# Patient Record
Sex: FEMALE | Race: WHITE | Employment: FULL TIME | URBAN - METROPOLITAN AREA
[De-identification: names, ages, dates, MRNs, and addresses within clinical notes are randomized per-mention and may not be internally consistent; named-entity substitution may affect disease eponyms.]

---

## 2017-04-26 ENCOUNTER — HOSPITAL ENCOUNTER (OUTPATIENT)
Dept: CT IMAGING | Facility: HOSPITAL | Age: 37
Discharge: HOME | End: 2017-04-26
Attending: OBSTETRICS & GYNECOLOGY

## 2017-04-26 DIAGNOSIS — Z13.9 ENCOUNTER FOR SCREENING: ICD-10-CM

## 2017-07-13 ENCOUNTER — APPOINTMENT (OUTPATIENT)
Dept: ULTRASOUND IMAGING | Facility: HOSPITAL | Age: 37
DRG: 872 | End: 2017-07-13
Attending: HOSPITALIST
Payer: COMMERCIAL

## 2017-07-13 ENCOUNTER — HOSPITAL ENCOUNTER (INPATIENT)
Facility: HOSPITAL | Age: 37
LOS: 1 days | Discharge: HOME | DRG: 872 | End: 2017-07-15
Attending: EMERGENCY MEDICINE | Admitting: HOSPITALIST
Payer: COMMERCIAL

## 2017-07-13 DIAGNOSIS — A41.9 SEPSIS DUE TO URINARY TRACT INFECTION (HCC): ICD-10-CM

## 2017-07-13 DIAGNOSIS — N39.0 URINARY TRACT INFECTION, SITE UNSPECIFIED: Primary | ICD-10-CM

## 2017-07-13 DIAGNOSIS — N39.0 SEPSIS DUE TO URINARY TRACT INFECTION (HCC): ICD-10-CM

## 2017-07-13 LAB
ANION GAP SERPL CALC-SCNC: 8 MMOL/L (ref 0–18)
B-HCG UR QL: NEGATIVE
BASOPHILS # BLD: 0 K/UL (ref 0–0.2)
BASOPHILS NFR BLD: 0 %
BILIRUB UR QL: NEGATIVE
BUN SERPL-MCNC: 12 MG/DL (ref 8–20)
BUN/CREAT SERPL: 15.2 (ref 10–20)
CALCIUM SERPL-MCNC: 8.2 MG/DL (ref 8.5–10.5)
CHLORIDE SERPL-SCNC: 112 MMOL/L (ref 95–110)
CO2 SERPL-SCNC: 17 MMOL/L (ref 22–32)
COLOR UR: YELLOW
CREAT SERPL-MCNC: 0.79 MG/DL (ref 0.5–1.5)
EOSINOPHIL # BLD: 0 K/UL (ref 0–0.7)
EOSINOPHIL NFR BLD: 1 %
ERYTHROCYTE [DISTWIDTH] IN BLOOD BY AUTOMATED COUNT: 13 % (ref 11–15)
GLUCOSE SERPL-MCNC: 115 MG/DL (ref 70–99)
GLUCOSE UR-MCNC: NEGATIVE MG/DL
HCT VFR BLD AUTO: 35 % (ref 35–48)
HGB BLD-MCNC: 12.2 G/DL (ref 12–16)
LACTATE SERPL-SCNC: 1.1 MMOL/L (ref 0.5–2.2)
LYMPHOCYTES # BLD: 0.8 K/UL (ref 1–4)
LYMPHOCYTES NFR BLD: 14 %
MCH RBC QN AUTO: 31.7 PG (ref 27–32)
MCHC RBC AUTO-ENTMCNC: 34.9 G/DL (ref 32–37)
MCV RBC AUTO: 90.8 FL (ref 80–100)
MONOCYTES # BLD: 0.2 K/UL (ref 0–1)
MONOCYTES NFR BLD: 4 %
NEUTROPHILS # BLD AUTO: 4.5 K/UL (ref 1.8–7.7)
NEUTROPHILS NFR BLD: 82 %
NITRITE UR QL STRIP.AUTO: POSITIVE
OSMOLALITY UR CALC.SUM OF ELEC: 285 MOSM/KG (ref 275–295)
PH UR: 5 [PH] (ref 5–8)
PLATELET # BLD AUTO: 127 K/UL (ref 140–400)
PMV BLD AUTO: 8.7 FL (ref 7.4–10.3)
POTASSIUM SERPL-SCNC: 3.4 MMOL/L (ref 3.3–5.1)
PROT UR-MCNC: 100 MG/DL
RBC # BLD AUTO: 3.86 M/UL (ref 3.7–5.4)
RBC #/AREA URNS AUTO: 15 /HPF
SODIUM SERPL-SCNC: 137 MMOL/L (ref 136–144)
SP GR UR STRIP: 1.01 (ref 1–1.03)
UROBILINOGEN UR STRIP-ACNC: <2
VIT C UR-MCNC: NEGATIVE MG/DL
WBC # BLD AUTO: 5.5 K/UL (ref 4–11)
WBC #/AREA URNS AUTO: 73 /HPF

## 2017-07-13 PROCEDURE — 87086 URINE CULTURE/COLONY COUNT: CPT | Performed by: EMERGENCY MEDICINE

## 2017-07-13 PROCEDURE — 87040 BLOOD CULTURE FOR BACTERIA: CPT | Performed by: EMERGENCY MEDICINE

## 2017-07-13 PROCEDURE — 87186 SC STD MICRODIL/AGAR DIL: CPT | Performed by: EMERGENCY MEDICINE

## 2017-07-13 PROCEDURE — 87088 URINE BACTERIA CULTURE: CPT | Performed by: EMERGENCY MEDICINE

## 2017-07-13 PROCEDURE — 81025 URINE PREGNANCY TEST: CPT

## 2017-07-13 PROCEDURE — 83605 ASSAY OF LACTIC ACID: CPT | Performed by: EMERGENCY MEDICINE

## 2017-07-13 PROCEDURE — 76770 US EXAM ABDO BACK WALL COMP: CPT | Performed by: HOSPITALIST

## 2017-07-13 PROCEDURE — 81001 URINALYSIS AUTO W/SCOPE: CPT | Performed by: EMERGENCY MEDICINE

## 2017-07-13 PROCEDURE — 96361 HYDRATE IV INFUSION ADD-ON: CPT

## 2017-07-13 PROCEDURE — 87077 CULTURE AEROBIC IDENTIFY: CPT | Performed by: EMERGENCY MEDICINE

## 2017-07-13 PROCEDURE — 36415 COLL VENOUS BLD VENIPUNCTURE: CPT

## 2017-07-13 PROCEDURE — 85025 COMPLETE CBC W/AUTO DIFF WBC: CPT | Performed by: EMERGENCY MEDICINE

## 2017-07-13 PROCEDURE — 96365 THER/PROPH/DIAG IV INF INIT: CPT

## 2017-07-13 PROCEDURE — 99285 EMERGENCY DEPT VISIT HI MDM: CPT

## 2017-07-13 PROCEDURE — 80048 BASIC METABOLIC PNL TOTAL CA: CPT | Performed by: EMERGENCY MEDICINE

## 2017-07-13 RX ORDER — SODIUM CHLORIDE 0.9 % (FLUSH) 0.9 %
3 SYRINGE (ML) INJECTION AS NEEDED
Status: DISCONTINUED | OUTPATIENT
Start: 2017-07-13 | End: 2017-07-15

## 2017-07-13 RX ORDER — IBUPROFEN 200 MG
CAPSULE ORAL DAILY
Status: DISCONTINUED | OUTPATIENT
Start: 2017-07-13 | End: 2017-07-15

## 2017-07-13 RX ORDER — ACETAMINOPHEN 500 MG
1000 TABLET ORAL ONCE
Status: COMPLETED | OUTPATIENT
Start: 2017-07-13 | End: 2017-07-13

## 2017-07-13 RX ORDER — ACETAMINOPHEN 325 MG/1
650 TABLET ORAL EVERY 6 HOURS PRN
Status: DISCONTINUED | OUTPATIENT
Start: 2017-07-13 | End: 2017-07-15

## 2017-07-13 RX ORDER — LIDOCAINE 50 MG/G
1 PATCH TOPICAL EVERY 24 HOURS
Status: COMPLETED | OUTPATIENT
Start: 2017-07-13 | End: 2017-07-14

## 2017-07-13 RX ORDER — ONDANSETRON 2 MG/ML
4 INJECTION INTRAMUSCULAR; INTRAVENOUS EVERY 6 HOURS PRN
Status: DISCONTINUED | OUTPATIENT
Start: 2017-07-13 | End: 2017-07-15

## 2017-07-13 RX ORDER — METOCLOPRAMIDE HYDROCHLORIDE 5 MG/ML
10 INJECTION INTRAMUSCULAR; INTRAVENOUS EVERY 8 HOURS PRN
Status: DISCONTINUED | OUTPATIENT
Start: 2017-07-13 | End: 2017-07-15

## 2017-07-13 RX ORDER — SODIUM CHLORIDE 9 MG/ML
INJECTION, SOLUTION INTRAVENOUS CONTINUOUS
Status: DISCONTINUED | OUTPATIENT
Start: 2017-07-13 | End: 2017-07-14

## 2017-07-13 RX ORDER — POTASSIUM CHLORIDE 20 MEQ/1
40 TABLET, EXTENDED RELEASE ORAL EVERY 4 HOURS
Status: COMPLETED | OUTPATIENT
Start: 2017-07-13 | End: 2017-07-14

## 2017-07-13 NOTE — H&P
[facility] Hospitalist H&P     CC: Patient presents with:  Nausea/Vomiting/Diarrhea (gastrointestinal)       PCP: Adrien Ibrahim Service is a 43year old female with PMH sig for infertility who presents with rigors working 7/13/17 [facility]). Soc Hx     Smoking status: Never Smoker    Smokeless tobacco: Not on file    Alcohol use Yes        Fam Hx  No family history on file.         Objective     Temp: 99.9 °F (37.7 °C)  Pulse: 105  Resp: 24  BP: 105/59

## 2017-07-13 NOTE — ED PROVIDER NOTES
Patient Seen in: [facility] AND [facility] Emergency Department    History   Patient presents with:  Nausea/Vomiting/Diarrhea (gastrointestinal)    Stated Complaint: N/V chills     HPI    Pt is 44 yo F who p/w sudden onset shaking chills and rigors while at work hot and clammy.  no rashes, Sutures on left heel clean/dry/intact        ED Course     Labs Reviewed   BASIC METABOLIC PANEL (8) - Abnormal; Notable for the following:        Result Value    Glucose 115 (*)     Chloride 112 (*)     CO2 17 (*)     Calcium, T collecting and interpreting tests and discussion with consultants but not including time spent performing procedures. [facility]      Sepsis Reassessment Note    /62   Pulse 109   Temp 100 °F (37.8 °C) (Oral)   Resp 18   Ht 162. 6

## 2017-07-14 LAB
ANION GAP SERPL CALC-SCNC: 6 MMOL/L (ref 0–18)
BASOPHILS # BLD: 0 K/UL (ref 0–0.2)
BASOPHILS NFR BLD: 0 %
BUN SERPL-MCNC: 7 MG/DL (ref 8–20)
BUN/CREAT SERPL: 10.3 (ref 10–20)
CALCIUM SERPL-MCNC: 8.5 MG/DL (ref 8.5–10.5)
CHLORIDE SERPL-SCNC: 112 MMOL/L (ref 95–110)
CO2 SERPL-SCNC: 20 MMOL/L (ref 22–32)
CREAT SERPL-MCNC: 0.68 MG/DL (ref 0.5–1.5)
EOSINOPHIL # BLD: 0.1 K/UL (ref 0–0.7)
EOSINOPHIL NFR BLD: 1 %
ERYTHROCYTE [DISTWIDTH] IN BLOOD BY AUTOMATED COUNT: 13 % (ref 11–15)
GLUCOSE SERPL-MCNC: 116 MG/DL (ref 70–99)
HCT VFR BLD AUTO: 33.9 % (ref 35–48)
HGB BLD-MCNC: 11.5 G/DL (ref 12–16)
LACTATE SERPL-SCNC: 0.8 MMOL/L (ref 0.5–2.2)
LYMPHOCYTES # BLD: 2.3 K/UL (ref 1–4)
LYMPHOCYTES NFR BLD: 28 %
MCH RBC QN AUTO: 31.2 PG (ref 27–32)
MCHC RBC AUTO-ENTMCNC: 34 G/DL (ref 32–37)
MCV RBC AUTO: 91.9 FL (ref 80–100)
MONOCYTES # BLD: 0.9 K/UL (ref 0–1)
MONOCYTES NFR BLD: 11 %
NEUTROPHILS # BLD AUTO: 5 K/UL (ref 1.8–7.7)
NEUTROPHILS NFR BLD: 60 %
OSMOLALITY UR CALC.SUM OF ELEC: 285 MOSM/KG (ref 275–295)
PLATELET # BLD AUTO: 111 K/UL (ref 140–400)
PMV BLD AUTO: 9.4 FL (ref 7.4–10.3)
POTASSIUM SERPL-SCNC: 4.2 MMOL/L (ref 3.3–5.1)
RBC # BLD AUTO: 3.69 M/UL (ref 3.7–5.4)
SODIUM SERPL-SCNC: 138 MMOL/L (ref 136–144)
WBC # BLD AUTO: 8.3 K/UL (ref 4–11)

## 2017-07-14 PROCEDURE — 87040 BLOOD CULTURE FOR BACTERIA: CPT | Performed by: HOSPITALIST

## 2017-07-14 PROCEDURE — 80048 BASIC METABOLIC PNL TOTAL CA: CPT | Performed by: HOSPITALIST

## 2017-07-14 PROCEDURE — 83605 ASSAY OF LACTIC ACID: CPT | Performed by: HOSPITALIST

## 2017-07-14 PROCEDURE — 85025 COMPLETE CBC W/AUTO DIFF WBC: CPT | Performed by: HOSPITALIST

## 2017-07-14 RX ORDER — TRAMADOL HYDROCHLORIDE 50 MG/1
50 TABLET ORAL EVERY 6 HOURS PRN
Status: DISCONTINUED | OUTPATIENT
Start: 2017-07-14 | End: 2017-07-15

## 2017-07-14 NOTE — PROGRESS NOTES
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  She also was having vomi 1330 : 160 lb (72.6 kg)  07/30/13 0854 : 120 lb (54.4 kg)      Exam  Gen: No acute distress, alert and oriented x3  Neck Supple, no JVD  Pulm: Lungs clear, normal respiratory effort, No wheezing or crackles  CV: Heart with regular rate and rhythm, No murmu thickening, mass, or calculus. CONCLUSION: Normal renal ultrasound.       DICTATED BY (CST): Kiara Barrett MD ON 7/13/2017 AT 18:58     APPROVED BY (CST): Kiara Barrett MD ON 7/13/2017 AT 18:59

## 2017-07-14 NOTE — CONSULTS
[facility]    PATIENT'S NAME: Haley Crawford   ATTENDING PHYSICIAN: Charles Roque MD   CONSULTING PHYSICIAN: Shelbi Chen.  Minal Haile MD   PATIENT ACCOUNT#:   [id number]    LOCATION:  [address] [place] [address] RECORD #:   [id number]       DATE OF BIRTH: noted.  ABDOMEN:  Nontender. No masses, rebound, or organomegaly appreciated. EXTREMITIES:  No clubbing, cyanosis, edema or phlebitis noted. IV site okay. LABORATORY DATA:  Blood culture 1 set with gram-negative rods.   Repeat blood cultures are pend

## 2017-07-14 NOTE — CONSULTS
Hector Haddad is a 43year old female. Patient presents with:  Nausea/Vomiting/Diarrhea (gastrointestinal)      HPI:    Back pain n/v evolving over 24 hrs. REVIEW OF SYSTEMS:   A comprehensive 11 point review of systems was completed.   Pertinent po CO2 20 07/14/2017    07/14/2017   CA 8.5 07/14/2017        Pending Labs     Order Current Status    BLOOD CULTURE In process    BLOOD CULTURE In process    BLOOD CULTURE In process    URINE CULTURE, ROUTINE In process    BLOOD CULTURE Preliminary 6 0 - 18 mmol/L   Calculated Osmolality 285 275 - 295 mOsm/kg   GFR, Non-African American >60 >=60   GFR, -American >60 >=60   -LACTIC ACID, PLASMA   Result Value Ref Range   Lactic Acid 0.8 0.5 - 2.2 mmol/L   -[facility] POCT PREGNANCY URINE   Result Valu Absolute 0.1 0.0 - 0.7 K/UL   Basophil Absolute 0.0 0.0 - 0.2 K/UL

## 2017-07-14 NOTE — PLAN OF CARE
Problem: GENITOURINARY - ADULT  Goal: Absence of urinary retention  INTERVENTIONS:  - Assess patient's ability to void and empty bladder  - Monitor intake/output and perform bladder scan as needed  - Consider collaborating with pharmacy to review patient's Provide assistive devices as appropriate    Outcome: Not Progressing

## 2017-07-15 VITALS
DIASTOLIC BLOOD PRESSURE: 68 MMHG | WEIGHT: 130 LBS | HEIGHT: 64 IN | OXYGEN SATURATION: 97 % | SYSTOLIC BLOOD PRESSURE: 118 MMHG | RESPIRATION RATE: 18 BRPM | BODY MASS INDEX: 22.2 KG/M2 | TEMPERATURE: 98 F | HEART RATE: 76 BPM

## 2017-07-15 LAB
ALBUMIN SERPL BCP-MCNC: 3 G/DL (ref 3.5–4.8)
ALBUMIN/GLOB SERPL: 1 {RATIO} (ref 1–2)
ALP SERPL-CCNC: 32 U/L (ref 32–100)
ALT SERPL-CCNC: 16 U/L (ref 14–54)
ANION GAP SERPL CALC-SCNC: 8 MMOL/L (ref 0–18)
AST SERPL-CCNC: 19 U/L (ref 15–41)
BASOPHILS # BLD: 0 K/UL (ref 0–0.2)
BASOPHILS NFR BLD: 0 %
BILIRUB SERPL-MCNC: 0.4 MG/DL (ref 0.3–1.2)
BUN SERPL-MCNC: 6 MG/DL (ref 8–20)
BUN/CREAT SERPL: 8.5 (ref 10–20)
CALCIUM SERPL-MCNC: 8.6 MG/DL (ref 8.5–10.5)
CHLORIDE SERPL-SCNC: 106 MMOL/L (ref 95–110)
CO2 SERPL-SCNC: 21 MMOL/L (ref 22–32)
CREAT SERPL-MCNC: 0.71 MG/DL (ref 0.5–1.5)
EOSINOPHIL # BLD: 0.2 K/UL (ref 0–0.7)
EOSINOPHIL NFR BLD: 3 %
ERYTHROCYTE [DISTWIDTH] IN BLOOD BY AUTOMATED COUNT: 13.1 % (ref 11–15)
GLOBULIN PLAS-MCNC: 3 G/DL (ref 2.5–3.7)
GLUCOSE SERPL-MCNC: 102 MG/DL (ref 70–99)
HCT VFR BLD AUTO: 33.6 % (ref 35–48)
HGB BLD-MCNC: 11.5 G/DL (ref 12–16)
LYMPHOCYTES # BLD: 2.3 K/UL (ref 1–4)
LYMPHOCYTES NFR BLD: 30 %
MCH RBC QN AUTO: 31.3 PG (ref 27–32)
MCHC RBC AUTO-ENTMCNC: 34.3 G/DL (ref 32–37)
MCV RBC AUTO: 91.1 FL (ref 80–100)
MONOCYTES # BLD: 1 K/UL (ref 0–1)
MONOCYTES NFR BLD: 13 %
NEUTROPHILS # BLD AUTO: 4 K/UL (ref 1.8–7.7)
NEUTROPHILS NFR BLD: 53 %
OSMOLALITY UR CALC.SUM OF ELEC: 278 MOSM/KG (ref 275–295)
PLATELET # BLD AUTO: 148 K/UL (ref 140–400)
PMV BLD AUTO: 8.8 FL (ref 7.4–10.3)
POTASSIUM SERPL-SCNC: 3.9 MMOL/L (ref 3.3–5.1)
PROT SERPL-MCNC: 6 G/DL (ref 5.9–8.4)
RBC # BLD AUTO: 3.68 M/UL (ref 3.7–5.4)
SODIUM SERPL-SCNC: 135 MMOL/L (ref 136–144)
WBC # BLD AUTO: 7.6 K/UL (ref 4–11)

## 2017-07-15 PROCEDURE — 85025 COMPLETE CBC W/AUTO DIFF WBC: CPT | Performed by: INTERNAL MEDICINE

## 2017-07-15 PROCEDURE — 80053 COMPREHEN METABOLIC PANEL: CPT | Performed by: INTERNAL MEDICINE

## 2017-07-15 RX ORDER — CEFDINIR 300 MG/1
300 CAPSULE ORAL 2 TIMES DAILY
Qty: 28 CAPSULE | Refills: 0 | Status: SHIPPED | OUTPATIENT
Start: 2017-07-15 | End: 2017-07-29

## 2017-07-15 RX ORDER — TRAMADOL HYDROCHLORIDE 50 MG/1
50 TABLET ORAL EVERY 8 HOURS PRN
Qty: 20 TABLET | Refills: 0 | Status: SHIPPED | OUTPATIENT
Start: 2017-07-15

## 2017-07-15 NOTE — PLAN OF CARE
Problem: Patient/Family Goals  Goal: Patient/Family Long Term Goal  Patient's Long Term Goal: RETURN HOME    Interventions:  - MONITOR VITAL SIGNS  - See additional Care Plan goals for specific interventions    Outcome: Progressing    Goal: Patient/Family

## 2017-07-15 NOTE — DISCHARGE SUMMARY
General Medicine Discharge Summary     Patient ID:  Mauricio Soriano  43year old  11/15/1980    Admit date: 7/13/2017    Discharge date and time: 7/15/2017  2:40 PM     Attending Physician: No att. providers found     Consults: IP CONSULT TO HOSPITALIST to vague back pain, no signs of pyelo or abscess  -LA is normal, Bicarb improved, HR and BP improved    Operative Procedures:      Imaging:         Disposition: home    Activity: activity as tolerated  Diet: regular diet  Wound Care: none needed  Code Stat Where to Get Your Medications      You can get these medications from any pharmacy    Bring a paper prescription for each of these medications  · cefdinir 300 MG Caps  · TraMADol HCl 50 MG Tabs                 Total Time Coordinating Care: Greater than 3

## 2017-07-15 NOTE — PLAN OF CARE
Problem: Patient/Family Goals  Goal: Patient/Family Long Term Goal  Patient's Long Term Goal: RETURN HOME    Interventions:  - MONITOR VITAL SIGNS  - See additional Care Plan goals for specific interventions    Outcome: Progressing  VSS.    Goal: Patient/Fa techniques  - Monitor for opioid side effects  - Notify MD/LIP if interventions unsuccessful or patient reports new pain   Outcome: Progressing  Tramadol for pain    Problem: SAFETY ADULT - FALL  Goal: Free from fall injury  INTERVENTIONS:  - Assess pt rudy

## 2017-07-15 NOTE — PROGRESS NOTES
[facility] AND [facility] Infectious Disease Progress Note    Shaun Platt Patient Status:  Inpatient    11/15/1980 MRN [id number]   Location [facility]W Attending Clarice Pagan MD   Hosp Day # 1 PCP TASHA LOONEY     Subjective:  Pt denies f/c. Skin: Normal texture and turgor. Neurologic: Cranial nerves are grossly intact. Motor strength and sensory examination is grossly normal.  No focal neurologic deficit.     Labs:    Lab Results  Component Value Date   WBC 7.6 07/15/2017   HGB 11.5 07/15/

## 2017-07-18 NOTE — PAYOR COMM NOTE
--------------  ADMISSION REVIEW     Payor: [employer]  Subscriber #:  [id number]  Authorization Number: [id number]    Admit date: 7/13/2017  1:06 PM       Admitting Physician: Michael Blake MD  Attending Physician:  No att. providers virgilio (*)     Protein Urine 100  (*)     Ketones Urine Trace (*)     Blood Urine Moderate (*)     Nitrite Urine Positive (*)     Leukocyte Esterase Urine Small (*)     WBC Urine 73 (*)     RBC URINE 15 (*)     Bacteria Urine Many (*)     All other components wit Normal  Heart Sounds: S1,S2    Lungs:   Right: Clear  Left: Clear    Peripheral Pulses:  Radial: Right 1+ or Left 1+      Capillary Refill:  <3 Secs    Skin:  Temp/Moisture: Warm and Dry  Color: Normal      Bob Funes  7/13/2017  3:04 PM      PHYSICI sent  -IVF, antiemetics, tylenol prn[JH.1]  -renal US due to vague back pain[JH.3]  -monitor closely    FEN  -IVF at 100  -lytes in AM  -General diet    PPX; SCD    Dispo pending course    Full code    Outpatient records reviewed confirming patient's medic 105/59[JH.2]    Exam  Gen: No acute distress, alert and oriented x3  Neck Supple, no JVD  Pulm: Lungs clear, normal respiratory effort, No wheezing or crackles  CV: Heart with regular rate and rhythm, No murmurs, rubs, gallops  Abd: Abdomen soft, nontender E. coli bacteremia and sepsis (temp, tachy)  -was on ceftriaxone, changed to ceftin on DC per ID  -ecoli pan sens  -repeat cultures NGTD  -ID consulted   -renal US due to vague back pain, no signs of pyelo or abscess  -LA is normal, Bicarb improved, HR and

## 2017-08-03 ENCOUNTER — CHARTING TRANS (OUTPATIENT)
Dept: OTHER | Age: 37
End: 2017-08-03

## 2017-08-14 ENCOUNTER — CHARTING TRANS (OUTPATIENT)
Dept: FAMILY MEDICINE | Age: 37
End: 2017-08-14

## 2018-06-18 ENCOUNTER — CHARTING TRANS (OUTPATIENT)
Dept: OTHER | Age: 38
End: 2018-06-18

## 2018-08-29 ENCOUNTER — CHARTING TRANS (OUTPATIENT)
Dept: OTHER | Age: 38
End: 2018-08-29

## 2018-08-29 ENCOUNTER — LAB SERVICES (OUTPATIENT)
Dept: OTHER | Age: 38
End: 2018-08-29

## 2018-08-29 LAB
BILIRUBIN URINE: NEGATIVE
BLOOD URINE: ABNORMAL
CLARITY: CLEAR
COLOR: YELLOW
GLUCOSE QUALITATIVE U: NEGATIVE
KETONES, URINE: NEGATIVE
LEUKOCYTE ESTERASE URINE: ABNORMAL
NITRITE URINE: NEGATIVE
PH URINE: 6.5 (ref 5–7)
SPECIFIC GRAVITY URINE: 1.01 (ref 1–1.03)
URINE PROTEIN, QUAL (DIPSTICK): NEGATIVE
UROBILINOGEN URINE: ABNORMAL

## 2018-08-30 LAB
FINAL REPORT: NORMAL
RED BLOOD CELLS URINE: NORMAL (ref 0–3)
SQUAMOUS EPITHELIAL CELLS: NORMAL
WHITE BLOOD CELLS URINE: NORMAL (ref 0–5)

## 2018-11-23 ENCOUNTER — IMAGING SERVICES (OUTPATIENT)
Dept: OTHER | Age: 38
End: 2018-11-23

## 2018-11-28 VITALS
HEART RATE: 68 BPM | SYSTOLIC BLOOD PRESSURE: 140 MMHG | WEIGHT: 134 LBS | TEMPERATURE: 98.2 F | DIASTOLIC BLOOD PRESSURE: 64 MMHG | RESPIRATION RATE: 18 BRPM

## 2019-04-30 NOTE — ED INITIAL ASSESSMENT (HPI)
Pt reports that she has chills and nausea yest and today while at a meeting she became diaphoretic had the chills and vomited 4 times today. Pt denies pain at this time.
unintentional
None known

## 2019-07-08 ENCOUNTER — TELEPHONE (OUTPATIENT)
Dept: FAMILY MEDICINE | Age: 39
End: 2019-07-08

## 2019-07-08 DIAGNOSIS — R35.0 FREQUENCY OF URINATION: ICD-10-CM

## 2019-07-08 DIAGNOSIS — R30.0 DYSURIA: Primary | ICD-10-CM

## 2019-07-08 DIAGNOSIS — R39.15 URINARY URGENCY: ICD-10-CM

## 2019-07-09 ENCOUNTER — LAB SERVICES (OUTPATIENT)
Dept: LAB | Age: 39
End: 2019-07-09

## 2019-07-09 ENCOUNTER — E-ADVICE (OUTPATIENT)
Dept: FAMILY MEDICINE | Age: 39
End: 2019-07-09

## 2019-07-09 DIAGNOSIS — R35.0 FREQUENCY OF URINATION: ICD-10-CM

## 2019-07-09 DIAGNOSIS — R30.0 DYSURIA: Primary | ICD-10-CM

## 2019-07-09 DIAGNOSIS — R39.15 URINARY URGENCY: ICD-10-CM

## 2019-07-09 LAB
BILIRUBIN URINE: NEGATIVE
BLOOD URINE: NEGATIVE
CLARITY: CLEAR
COLOR: YELLOW
GLUCOSE QUALITATIVE U: NEGATIVE
KETONES, URINE: NEGATIVE
LEUKOCYTE ESTERASE URINE: NEGATIVE
NITRITE URINE: NEGATIVE
PH URINE: 6.5 (ref 5–7)
RED BLOOD CELLS URINE: 0 (ref 0–3)
SPECIFIC GRAVITY URINE: 1.01 (ref 1–1.03)
SQUAMOUS EPITHELIAL CELLS: NORMAL
URINE PROTEIN, QUAL (DIPSTICK): NEGATIVE
UROBILINOGEN URINE: NORMAL
WHITE BLOOD CELLS URINE: 0 (ref 0–5)

## 2019-07-09 PROCEDURE — 87086 URINE CULTURE/COLONY COUNT: CPT | Performed by: FAMILY MEDICINE

## 2019-07-09 PROCEDURE — 81001 URINALYSIS AUTO W/SCOPE: CPT | Performed by: FAMILY MEDICINE

## 2019-07-10 LAB — FINAL REPORT: NORMAL

## 2019-09-18 ENCOUNTER — APPOINTMENT (OUTPATIENT)
Dept: FAMILY MEDICINE | Age: 39
End: 2019-09-18

## 2019-09-24 ENCOUNTER — HOSPITAL ENCOUNTER (OUTPATIENT)
Age: 39
Discharge: HOME | End: 2019-09-24
Attending: FAMILY MEDICINE
Payer: COMMERCIAL

## 2019-09-24 VITALS
HEIGHT: 63 IN | RESPIRATION RATE: 16 BRPM | TEMPERATURE: 99 F | WEIGHT: 140 LBS | SYSTOLIC BLOOD PRESSURE: 116 MMHG | DIASTOLIC BLOOD PRESSURE: 86 MMHG | BODY MASS INDEX: 24.8 KG/M2 | HEART RATE: 63 BPM | OXYGEN SATURATION: 98 %

## 2019-09-24 DIAGNOSIS — N30.01 ACUTE CYSTITIS WITH HEMATURIA: Primary | ICD-10-CM

## 2019-09-24 LAB
POCT BILIRUBIN URINE: NEGATIVE
POCT GLUCOSE URINE: NEGATIVE MG/DL
POCT KETONE URINE: NEGATIVE MG/DL
POCT NITRITE URINE: NEGATIVE
POCT PH URINE: 7 (ref 5–8)
POCT PROTEIN URINE: NEGATIVE MG/DL
POCT SPECIFIC GRAVITY URINE: 1.01
POCT URINE COLOR: YELLOW
POCT URINE PREGNANCY: NEGATIVE
POCT UROBILINOGEN URINE: 0.2 MG/DL

## 2019-09-24 PROCEDURE — 87088 URINE BACTERIA CULTURE: CPT | Performed by: FAMILY MEDICINE

## 2019-09-24 PROCEDURE — 81002 URINALYSIS NONAUTO W/O SCOPE: CPT | Performed by: FAMILY MEDICINE

## 2019-09-24 PROCEDURE — 87086 URINE CULTURE/COLONY COUNT: CPT | Performed by: FAMILY MEDICINE

## 2019-09-24 PROCEDURE — 99214 OFFICE O/P EST MOD 30 MIN: CPT

## 2019-09-24 PROCEDURE — 81025 URINE PREGNANCY TEST: CPT | Performed by: FAMILY MEDICINE

## 2019-09-24 PROCEDURE — 87186 SC STD MICRODIL/AGAR DIL: CPT | Performed by: FAMILY MEDICINE

## 2019-09-24 PROCEDURE — 99204 OFFICE O/P NEW MOD 45 MIN: CPT

## 2019-09-24 RX ORDER — CIPROFLOXACIN 500 MG/1
500 TABLET, FILM COATED ORAL 2 TIMES DAILY
Qty: 14 TABLET | Refills: 0 | Status: SHIPPED | OUTPATIENT
Start: 2019-09-24 | End: 2019-10-01

## 2019-09-24 NOTE — ED PROVIDER NOTES
Patient Seen in: [address] [facility]      History   Patient presents with:  Dysuria    Stated Complaint: painful urination    HPI  68-year-old female coming in with complaints of increased frequency and burning associated with urination for t and oriented to person place and time  GAIT: Normal          ED Course     Labs Reviewed   POCT URINALYSIS DIPSTICK - Abnormal; Notable for the following components:       Result Value    Urine Clarity Cloudy (*)     Blood, Urine Trace-Intact (*)     Leuko for 7 days. , Normal, Disp-14 tablet, R-0                            EpicACT:[email or web address]_POPUP,RunParamsURLEncoded:701%7C

## 2019-09-26 NOTE — ED NOTES
Patient identified herself by full name on voicemail. Left message with results and to finish full course of medication. Left [facility] phone number to return call with further questions or concerns.

## 2020-07-13 ENCOUNTER — NURSE TRIAGE (OUTPATIENT)
Dept: TELEHEALTH | Age: 40
End: 2020-07-13

## 2020-10-02 ENCOUNTER — NURSE ONLY (OUTPATIENT)
Dept: PEDIATRICS | Age: 40
End: 2020-10-02

## 2020-10-02 DIAGNOSIS — Z23 NEED FOR INFLUENZA VACCINATION: Primary | ICD-10-CM

## 2020-10-02 PROCEDURE — 90686 IIV4 VACC NO PRSV 0.5 ML IM: CPT

## 2020-10-02 PROCEDURE — 90471 IMMUNIZATION ADMIN: CPT

## 2021-03-16 LAB
CYTOLOGY CVX/VAG DOC THIN PREP: NORMAL
HPV16+18+45 E6+E7MRNA CVX NAA+PROBE: NEGATIVE

## 2021-06-18 ENCOUNTER — TELEPHONE (OUTPATIENT)
Dept: FAMILY MEDICINE | Age: 41
End: 2021-06-18

## 2021-06-18 ENCOUNTER — HOSPITAL ENCOUNTER (OUTPATIENT)
Age: 41
Discharge: HOME | End: 2021-06-18
Payer: COMMERCIAL

## 2021-06-18 VITALS
OXYGEN SATURATION: 98 % | RESPIRATION RATE: 16 BRPM | HEART RATE: 80 BPM | DIASTOLIC BLOOD PRESSURE: 86 MMHG | TEMPERATURE: 98 F | SYSTOLIC BLOOD PRESSURE: 129 MMHG

## 2021-06-18 DIAGNOSIS — R35.0 FREQUENCY OF URINATION: ICD-10-CM

## 2021-06-18 DIAGNOSIS — R39.15 URINARY URGENCY: ICD-10-CM

## 2021-06-18 DIAGNOSIS — R30.0 DYSURIA: Primary | ICD-10-CM

## 2021-06-18 DIAGNOSIS — N30.01 ACUTE CYSTITIS WITH HEMATURIA: Primary | ICD-10-CM

## 2021-06-18 PROCEDURE — 81002 URINALYSIS NONAUTO W/O SCOPE: CPT | Performed by: NURSE PRACTITIONER

## 2021-06-18 PROCEDURE — 99213 OFFICE O/P EST LOW 20 MIN: CPT | Performed by: NURSE PRACTITIONER

## 2021-06-18 PROCEDURE — 81002 URINALYSIS NONAUTO W/O SCOPE: CPT | Performed by: FAMILY MEDICINE

## 2021-06-18 RX ORDER — SULFAMETHOXAZOLE AND TRIMETHOPRIM 800; 160 MG/1; MG/1
1 TABLET ORAL 2 TIMES DAILY
Qty: 10 TABLET | Refills: 0 | Status: SHIPPED | OUTPATIENT
Start: 2021-06-18 | End: 2021-06-23

## 2021-06-18 NOTE — ED INITIAL ASSESSMENT (HPI)
6/17 Pt c/o slight dysuria and lower back pain, freq with urination.   Pt states she \"gets a UTI every 6 months or so\"

## 2021-06-18 NOTE — ED PROVIDER NOTES
Patient Seen in: [facility]      History   Patient presents with:  Urinary Symptoms  Back Pain    Stated Complaint: uti    HPI/Subjective:   20-year-old female presents to the IC with complaints of increased urine frequency and urgency for las has moist mucous membranes. NECK: Supple. No meningitic signs are noted. There is no adenopathy noted. CHEST/LUNGS: Clear to auscultation. There is no respiratory distress noted. HEART/CARDIOVASCULAR: Regular. There is no tachycardia.   There is no g medications    Sulfamethoxazole-TMP -160 MG Oral Tab per tablet  Take 1 tablet by mouth 2 (two) times daily for 5 days. , Normal, Disp-10 tablet, R-0

## 2021-06-25 ENCOUNTER — LAB SERVICES (OUTPATIENT)
Dept: LAB | Age: 41
End: 2021-06-25

## 2021-06-25 DIAGNOSIS — R39.15 URINARY URGENCY: ICD-10-CM

## 2021-06-25 DIAGNOSIS — R30.0 DYSURIA: ICD-10-CM

## 2021-06-25 DIAGNOSIS — R35.0 FREQUENCY OF URINATION: ICD-10-CM

## 2021-06-25 LAB
APPEARANCE, POC: CLEAR
BILIRUBIN, POC: NEGATIVE
COLOR, POC: YELLOW
GLUCOSE UR-MCNC: NEGATIVE MG/DL
KETONES, POC: NEGATIVE MG/DL
NITRITE, POC: NEGATIVE
OCCULT BLOOD, POC: ABNORMAL
PH UR: 6.5 [PH] (ref 5–7)
PROT UR-MCNC: NEGATIVE MG/DL
RED BLOOD CELLS URINE: NORMAL (ref 0–3)
SP GR UR: 1.01 (ref 1–1.03)
SQUAMOUS EPITHELIAL CELLS: NORMAL
UROBILINOGEN UR-MCNC: 0.2 MG/DL (ref 0–1)
WBC (LEUKOCYTE) ESTERASE, POC: ABNORMAL
WHITE BLOOD CELLS URINE: NORMAL (ref 0–5)

## 2021-06-25 PROCEDURE — 87086 URINE CULTURE/COLONY COUNT: CPT | Performed by: PHYSICIAN ASSISTANT

## 2021-06-25 PROCEDURE — 81015 MICROSCOPIC EXAM OF URINE: CPT | Performed by: PHYSICIAN ASSISTANT

## 2021-06-25 NOTE — ED NOTES
Patient calling to state she finished her abx for her uti and is again having symptoms today. Pt states she had some left over macrobid and took one today. Advised patient to be re-seen by either pcp or at ic. No culture was done during last visit.  Patient

## 2021-06-26 LAB — FINAL REPORT: NORMAL

## 2021-09-22 ENCOUNTER — APPOINTMENT (OUTPATIENT)
Dept: HEMATOLOGY/ONCOLOGY | Facility: HOSPITAL | Age: 41
End: 2021-09-22
Payer: COMMERCIAL

## 2021-09-22 ENCOUNTER — APPOINTMENT (OUTPATIENT)
Dept: GENETICS | Facility: HOSPITAL | Age: 41
End: 2021-09-22
Payer: COMMERCIAL

## 2022-02-23 ENCOUNTER — E-ADVICE (OUTPATIENT)
Dept: FAMILY MEDICINE | Age: 42
End: 2022-02-23

## 2022-02-24 RX ORDER — DESOGESTREL AND ETHINYL ESTRADIOL 0.15-0.03
1 KIT ORAL DAILY
COMMUNITY
Start: 2021-12-15

## 2022-02-26 ENCOUNTER — APPOINTMENT (OUTPATIENT)
Dept: FAMILY MEDICINE | Age: 42
End: 2022-02-26

## 2022-02-26 ENCOUNTER — OFFICE VISIT (OUTPATIENT)
Dept: FAMILY MEDICINE | Age: 42
End: 2022-02-26

## 2022-02-26 VITALS
BODY MASS INDEX: 26.19 KG/M2 | WEIGHT: 153.4 LBS | HEART RATE: 80 BPM | TEMPERATURE: 98 F | SYSTOLIC BLOOD PRESSURE: 116 MMHG | RESPIRATION RATE: 12 BRPM | HEIGHT: 64 IN | OXYGEN SATURATION: 100 % | DIASTOLIC BLOOD PRESSURE: 72 MMHG

## 2022-02-26 DIAGNOSIS — Z00.00 ENCOUNTER FOR PREVENTIVE HEALTH EXAMINATION: Primary | ICD-10-CM

## 2022-02-26 PROCEDURE — 99396 PREV VISIT EST AGE 40-64: CPT | Performed by: PHYSICIAN ASSISTANT

## 2022-02-26 ASSESSMENT — PATIENT HEALTH QUESTIONNAIRE - PHQ9
2. FEELING DOWN, DEPRESSED OR HOPELESS: NOT AT ALL
SUM OF ALL RESPONSES TO PHQ9 QUESTIONS 1 AND 2: 0
SUM OF ALL RESPONSES TO PHQ9 QUESTIONS 1 AND 2: 0
CLINICAL INTERPRETATION OF PHQ2 SCORE: NO FURTHER SCREENING NEEDED
1. LITTLE INTEREST OR PLEASURE IN DOING THINGS: NOT AT ALL

## 2022-03-01 ENCOUNTER — APPOINTMENT (OUTPATIENT)
Dept: INTERNAL MEDICINE | Age: 42
End: 2022-03-01

## 2022-11-07 ENCOUNTER — IMAGING SERVICES (OUTPATIENT)
Dept: OTHER | Age: 42
End: 2022-11-07

## 2024-02-07 ENCOUNTER — CLINICAL ABSTRACT (OUTPATIENT)
Dept: CARE COORDINATION | Age: 44
End: 2024-02-07

## 2024-05-24 ENCOUNTER — APPOINTMENT (OUTPATIENT)
Dept: FAMILY MEDICINE | Age: 44
End: 2024-05-24

## 2024-06-04 ENCOUNTER — E-ADVICE (OUTPATIENT)
Dept: FAMILY MEDICINE | Age: 44
End: 2024-06-04

## 2024-06-05 ENCOUNTER — APPOINTMENT (OUTPATIENT)
Dept: FAMILY MEDICINE | Age: 44
End: 2024-06-05

## 2024-06-19 ENCOUNTER — APPOINTMENT (OUTPATIENT)
Dept: FAMILY MEDICINE | Age: 44
End: 2024-06-19

## 2024-06-24 ENCOUNTER — APPOINTMENT (OUTPATIENT)
Dept: FAMILY MEDICINE | Age: 44
End: 2024-06-24

## 2024-06-24 DIAGNOSIS — Z13.220 NEED FOR LIPID SCREENING: ICD-10-CM

## 2024-06-24 DIAGNOSIS — Z00.00 ANNUAL PHYSICAL EXAM: Primary | ICD-10-CM

## 2024-06-24 DIAGNOSIS — Z13.29 SCREENING FOR THYROID DISORDER: ICD-10-CM

## 2024-07-01 ENCOUNTER — E-ADVICE (OUTPATIENT)
Dept: FAMILY MEDICINE | Age: 44
End: 2024-07-01

## 2024-07-01 ENCOUNTER — APPOINTMENT (OUTPATIENT)
Dept: FAMILY MEDICINE | Age: 44
End: 2024-07-01

## 2024-07-01 ENCOUNTER — TELEPHONE (OUTPATIENT)
Dept: FAMILY MEDICINE | Age: 44
End: 2024-07-01

## 2024-07-09 ENCOUNTER — E-ADVICE (OUTPATIENT)
Dept: FAMILY MEDICINE | Age: 44
End: 2024-07-09

## 2024-08-06 ENCOUNTER — APPOINTMENT (OUTPATIENT)
Dept: FAMILY MEDICINE | Age: 44
End: 2024-08-06

## 2024-08-06 VITALS
TEMPERATURE: 97.2 F | RESPIRATION RATE: 20 BRPM | BODY MASS INDEX: 29.06 KG/M2 | SYSTOLIC BLOOD PRESSURE: 122 MMHG | HEIGHT: 63 IN | HEART RATE: 71 BPM | DIASTOLIC BLOOD PRESSURE: 80 MMHG | WEIGHT: 164 LBS | OXYGEN SATURATION: 98 %

## 2024-08-06 DIAGNOSIS — Z84.1 FAMILY HISTORY OF KIDNEY DISEASE: ICD-10-CM

## 2024-08-06 DIAGNOSIS — R92.30 DENSE BREASTS: ICD-10-CM

## 2024-08-06 DIAGNOSIS — Z00.01 ENCOUNTER FOR GENERAL ADULT MEDICAL EXAMINATION WITH ABNORMAL FINDINGS: Primary | ICD-10-CM

## 2024-08-06 DIAGNOSIS — Z90.710 HISTORY OF TOTAL HYSTERECTOMY: ICD-10-CM

## 2024-08-06 DIAGNOSIS — E66.3 OVERWEIGHT: ICD-10-CM

## 2024-08-06 PROBLEM — Z86.19 HISTORY OF SEPSIS: Status: ACTIVE | Noted: 2024-08-06

## 2024-08-06 PROCEDURE — 99396 PREV VISIT EST AGE 40-64: CPT | Performed by: NURSE PRACTITIONER

## 2024-08-06 PROCEDURE — 99214 OFFICE O/P EST MOD 30 MIN: CPT | Performed by: NURSE PRACTITIONER

## 2024-08-06 RX ORDER — ESTRADIOL 0.05 MG/D
PATCH, EXTENDED RELEASE TRANSDERMAL
COMMUNITY
End: 2024-08-06 | Stop reason: SDUPTHER

## 2024-08-06 RX ORDER — ESTRADIOL 0.05 MG/D
1 PATCH, EXTENDED RELEASE TRANSDERMAL
Qty: 24 EACH | Refills: 3 | Status: SHIPPED | OUTPATIENT
Start: 2024-08-08

## 2024-08-06 SDOH — ECONOMIC STABILITY: TRANSPORTATION INSECURITY
IN THE PAST 12 MONTHS, HAS LACK OF RELIABLE TRANSPORTATION KEPT YOU FROM MEDICAL APPOINTMENTS, MEETINGS, WORK OR FROM GETTING THINGS NEEDED FOR DAILY LIVING?: NO

## 2024-08-06 SDOH — ECONOMIC STABILITY: HOUSING INSECURITY: WHAT IS YOUR LIVING SITUATION TODAY?: I HAVE A STEADY PLACE TO LIVE

## 2024-08-06 SDOH — HEALTH STABILITY: PHYSICAL HEALTH: ON AVERAGE, HOW MANY DAYS PER WEEK DO YOU ENGAGE IN MODERATE TO STRENUOUS EXERCISE (LIKE A BRISK WALK)?: 4 DAYS

## 2024-08-06 SDOH — ECONOMIC STABILITY: FOOD INSECURITY: WITHIN THE PAST 12 MONTHS, THE FOOD YOU BOUGHT JUST DIDN'T LAST AND YOU DIDN'T HAVE MONEY TO GET MORE.: PATIENT DECLINED

## 2024-08-06 SDOH — ECONOMIC STABILITY: HOUSING INSECURITY: DO YOU HAVE PROBLEMS WITH ANY OF THE FOLLOWING?: NONE OF THE ABOVE

## 2024-08-06 SDOH — SOCIAL STABILITY: SOCIAL NETWORK
HOW OFTEN DO YOU SEE OR TALK TO PEOPLE THAT YOU CARE ABOUT AND FEEL CLOSE TO? (FOR EXAMPLE: TALKING TO FRIENDS ON THE PHONE, VISITING FRIENDS OR FAMILY, GOING TO CHURCH OR CLUB MEETINGS): 5 OR MORE TIMES A WEEK

## 2024-08-06 SDOH — HEALTH STABILITY: PHYSICAL HEALTH: ON AVERAGE, HOW MANY MINUTES DO YOU ENGAGE IN EXERCISE AT THIS LEVEL?: 50 MIN

## 2024-08-06 SDOH — ECONOMIC STABILITY: GENERAL

## 2024-08-06 ASSESSMENT — ENCOUNTER SYMPTOMS
DIARRHEA: 0
EYE ITCHING: 0
NERVOUS/ANXIOUS: 0
BACK PAIN: 0
DIZZINESS: 0
NUMBNESS: 0
ABDOMINAL PAIN: 0
SINUS PAIN: 0
BLOOD IN STOOL: 0
FACIAL SWELLING: 0
SORE THROAT: 0
APPETITE CHANGE: 0
SINUS PRESSURE: 0
WHEEZING: 0
WEAKNESS: 0
PHOTOPHOBIA: 0
HEADACHES: 0
CONSTIPATION: 0
EYE REDNESS: 0
BRUISES/BLEEDS EASILY: 0
SHORTNESS OF BREATH: 0
EYE DISCHARGE: 0
ACTIVITY CHANGE: 0
UNEXPECTED WEIGHT CHANGE: 0
RHINORRHEA: 0
FEVER: 0
EYE PAIN: 0
FATIGUE: 0
ADENOPATHY: 0
SLEEP DISTURBANCE: 0
COUGH: 0
VOMITING: 0
CHEST TIGHTNESS: 0

## 2024-08-06 ASSESSMENT — LIFESTYLE VARIABLES
HOW OFTEN DO YOU HAVE A DRINK CONTAINING ALCOHOL: 2 TO 3 TIMES PER WEEK
HOW MANY STANDARD DRINKS CONTAINING ALCOHOL DO YOU HAVE ON A TYPICAL DAY: 0,1 OR 2
AUDIT-C TOTAL SCORE: 3

## 2024-08-06 ASSESSMENT — VISUAL ACUITY: OU: 1

## 2024-08-06 ASSESSMENT — PATIENT HEALTH QUESTIONNAIRE - PHQ9
CLINICAL INTERPRETATION OF PHQ2 SCORE: NO FURTHER SCREENING NEEDED
2. FEELING DOWN, DEPRESSED OR HOPELESS: NOT AT ALL
1. LITTLE INTEREST OR PLEASURE IN DOING THINGS: NOT AT ALL
SUM OF ALL RESPONSES TO PHQ9 QUESTIONS 1 AND 2: 0
SUM OF ALL RESPONSES TO PHQ9 QUESTIONS 1 AND 2: 0

## 2024-08-06 ASSESSMENT — SOCIAL DETERMINANTS OF HEALTH (SDOH): IN THE PAST 12 MONTHS, HAS THE ELECTRIC, GAS, OIL, OR WATER COMPANY THREATENED TO SHUT OFF SERVICE IN YOUR HOME?: NO

## 2024-09-12 ENCOUNTER — TELEPHONE (OUTPATIENT)
Age: 44
End: 2024-09-12

## 2024-09-12 ENCOUNTER — E-ADVICE (OUTPATIENT)
Dept: GASTROENTEROLOGY | Age: 44
End: 2024-09-12

## 2024-09-12 DIAGNOSIS — Z12.11 SPECIAL SCREENING FOR MALIGNANT NEOPLASMS, COLON: Primary | ICD-10-CM

## 2024-09-13 RX ORDER — SOD SULF/POT CHLORIDE/MAG SULF 1.479 G
12 TABLET ORAL SEE ADMIN INSTRUCTIONS
Qty: 24 TABLET | Refills: 0 | Status: SHIPPED | OUTPATIENT
Start: 2024-09-13

## 2024-09-13 RX ORDER — BISACODYL 5 MG/1
TABLET, DELAYED RELEASE ORAL
Qty: 2 TABLET | Refills: 0 | Status: SHIPPED | OUTPATIENT
Start: 2024-09-13 | End: 2024-10-28

## 2024-09-13 RX ORDER — SIMETHICONE 125 MG
TABLET,CHEWABLE ORAL
Qty: 2 TABLET | Refills: 0 | Status: SHIPPED | OUTPATIENT
Start: 2024-09-13 | End: 2024-10-28

## 2024-09-19 ENCOUNTER — APPOINTMENT (OUTPATIENT)
Dept: MRI IMAGING | Age: 44
End: 2024-09-19
Attending: NURSE PRACTITIONER

## 2024-09-19 DIAGNOSIS — R92.30 DENSE BREASTS: ICD-10-CM

## 2024-09-19 PROCEDURE — 77049 MRI BREAST C-+ W/CAD BI: CPT | Performed by: RADIOLOGY

## 2024-09-19 PROCEDURE — A9585 GADOBUTROL INJECTION: HCPCS | Performed by: RADIOLOGY

## 2024-09-19 RX ORDER — GADOBUTROL 604.72 MG/ML
7 INJECTION INTRAVENOUS ONCE
Status: COMPLETED | OUTPATIENT
Start: 2024-09-19 | End: 2024-09-19

## 2024-09-19 RX ADMIN — GADOBUTROL 7 ML: 604.72 INJECTION INTRAVENOUS at 11:07

## 2024-10-03 ENCOUNTER — TELEPHONE (OUTPATIENT)
Dept: SURGERY | Age: 44
End: 2024-10-03

## 2024-10-04 ENCOUNTER — TELEPHONE (OUTPATIENT)
Dept: GASTROENTEROLOGY | Age: 44
End: 2024-10-04

## 2024-10-04 ENCOUNTER — E-ADVICE (OUTPATIENT)
Dept: GASTROENTEROLOGY | Age: 44
End: 2024-10-04

## 2024-10-04 DIAGNOSIS — Z12.11 SPECIAL SCREENING FOR MALIGNANT NEOPLASMS, COLON: Primary | ICD-10-CM

## 2024-10-18 ENCOUNTER — APPOINTMENT (OUTPATIENT)
Dept: GASTROENTEROLOGY | Age: 44
End: 2024-10-18
Attending: STUDENT IN AN ORGANIZED HEALTH CARE EDUCATION/TRAINING PROGRAM

## 2024-10-22 ENCOUNTER — TELEPHONE (OUTPATIENT)
Dept: GASTROENTEROLOGY | Age: 44
End: 2024-10-22

## 2024-10-24 ENCOUNTER — OFFICE VISIT (OUTPATIENT)
Dept: FAMILY MEDICINE CLINIC | Facility: CLINIC | Age: 44
End: 2024-10-24
Payer: COMMERCIAL

## 2024-10-24 VITALS
HEART RATE: 93 BPM | WEIGHT: 154 LBS | SYSTOLIC BLOOD PRESSURE: 140 MMHG | HEIGHT: 63 IN | RESPIRATION RATE: 18 BRPM | BODY MASS INDEX: 27.29 KG/M2 | TEMPERATURE: 99 F | DIASTOLIC BLOOD PRESSURE: 98 MMHG | OXYGEN SATURATION: 100 %

## 2024-10-24 DIAGNOSIS — R39.9 UTI SYMPTOMS: Primary | ICD-10-CM

## 2024-10-24 LAB
BILIRUBIN: NEGATIVE
CONTROL LINE PRESENT WITH A CLEAR BACKGROUND (YES/NO): YES YES/NO
GLUCOSE (URINE DIPSTICK): NEGATIVE MG/DL
KETONES (URINE DIPSTICK): NEGATIVE MG/DL
KIT LOT #: NORMAL NUMERIC
MULTISTIX LOT#: ABNORMAL NUMERIC
NITRITE, URINE: NEGATIVE
PH, URINE: 7 (ref 4.5–8)
PROTEIN (URINE DIPSTICK): 30 MG/DL
SPECIFIC GRAVITY: 1.01 (ref 1–1.03)
URINE-COLOR: YELLOW
UROBILINOGEN,SEMI-QN: 0.2 MG/DL (ref 0–1.9)

## 2024-10-24 PROCEDURE — 87077 CULTURE AEROBIC IDENTIFY: CPT | Performed by: NURSE PRACTITIONER

## 2024-10-24 PROCEDURE — 87186 SC STD MICRODIL/AGAR DIL: CPT | Performed by: NURSE PRACTITIONER

## 2024-10-24 PROCEDURE — 81025 URINE PREGNANCY TEST: CPT | Performed by: NURSE PRACTITIONER

## 2024-10-24 PROCEDURE — 99203 OFFICE O/P NEW LOW 30 MIN: CPT | Performed by: NURSE PRACTITIONER

## 2024-10-24 PROCEDURE — 81003 URINALYSIS AUTO W/O SCOPE: CPT | Performed by: NURSE PRACTITIONER

## 2024-10-24 PROCEDURE — 87086 URINE CULTURE/COLONY COUNT: CPT | Performed by: NURSE PRACTITIONER

## 2024-10-24 RX ORDER — NITROFURANTOIN 25; 75 MG/1; MG/1
100 CAPSULE ORAL 2 TIMES DAILY
Qty: 14 CAPSULE | Refills: 0 | Status: SHIPPED | OUTPATIENT
Start: 2024-10-24 | End: 2024-10-31

## 2024-10-24 RX ORDER — NORETHINDRONE ACETATE AND ETHINYL ESTRADIOL 1MG-20(21)
KIT ORAL
COMMUNITY

## 2024-10-24 RX ORDER — MULTIVITAMIN
1 TABLET ORAL DAILY
COMMUNITY

## 2024-10-24 NOTE — PROGRESS NOTES
CHIEF COMPLAINT:     Chief Complaint   Patient presents with    Urinary Symptoms     Symptoms started last week: blood in urine, frequency, and burning sensation when urinating.   OTC: none        HPI:   Cait Santiago is a 43 year old female who presents with symptoms of UTI. Complaining of urinary frequency, urgency, dysuria and slight hematuria for last 4-5 days.Denies flank pain, fever,  nausea, or vomiting.  Denies abdominal pain, pelvic pain, vaginal discharge, bleeding, itching, or concerns for std's. Tolerates PO well at home. No n/v/d.  Denies any other aggravating or relieving factors at home. Denies any other treatment attempts prior to arrival.       Current Outpatient Medications   Medication Sig Dispense Refill    nitrofurantoin monohydrate macro (MACROBID) 100 MG Oral Cap Take 1 capsule (100 mg total) by mouth 2 (two) times daily for 7 days. 14 capsule 0    Ibuprofen Does not apply Powder Take by mouth daily as needed.      Multiple Vitamin (MULTI-VITAMIN) Oral Tab Take 1 tablet by mouth daily.      Norethin Ace-Eth Estrad-FE (BLISOVI FE 1/20) 1-20 MG-MCG Oral Tab Oral for 84 Days      TraMADol HCl 50 MG Oral Tab Take 1 tablet (50 mg total) by mouth every 8 (eight) hours as needed (for severe pain). (Patient not taking: Reported on 6/18/2021 ) 20 tablet 0    UNKNOWN TO PATIENT - BIRTH CONTROL Take 1 tablet by mouth daily.        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 Check all that apply.: None   Food Insecurity: Patient Declined (8/6/2024)    Received from [facility]    Food Insecurity     Within the past 12 months, you worried that your food would run out before you got money to buy more.  : Patient declined     Within the past 12 months, the food you bought just didn't last and you didn't have money to get more. : Patient declined   Transportation Needs: Not At Risk (8/6/2024)    Received from [facility]    Transportation Needs     In the past 12 months, has lack of reliable transportation kept you from medical appointments, meetings, work or from getting things needed for daily living? : No   Physical Activity: Low Risk  (8/6/2024)    Received from [facility]    Exercise Vital Sign     On average, how many days per week do you engage in moderate to strenuous exercise (like a brisk walk)?: 4 days     On average, how many minutes do you engage in exercise at this level?: 50 min   Stress: Low Risk  (8/6/2024)    Received from [facility]    Stress     Stress is when someone feels tense, nervous, anxious, or can't sleep at night because their mind is troubled. How stressed are you? : A little bit   Social Connections: Low Risk  (8/6/2024)    Received from [facility]    Social Connections     How often do you see or talk to people that you care about and feel close to? (For example: talking to friends on the phone, visiting friends or family, going to L[employer] or club meetings): 5 or more times a week    Received from [facility]    Housing Stability         REVIEW OF SYSTEMS:   GENERAL: Denies fever, chills, or body aches  SKIN: no rashes, no skin wounds or ulcers.  GI: Denies abdominal pain.  No N/V/D.   : See HPI.  NEURO: no headaches.    EXAM:   BP (!) 140/98   Pulse 93   Temp 98.7 °F (37.1 °C) (Temporal)   Resp 18   Ht 5' 3\" (1.6 m)   Wt 154 lb (69.9 kg)   LMP  (LMP Unknown)   SpO2 100%   BMI 27.28 kg/m²   GENERAL: well developed, well nourished,in no apparent distress  CARDIO: RRR, no murmurs  LUNGS: clear to ausculation bilaterally, no wheezing or rhonchi  GI: No tenderness or guarding with palpation.No hepatosplenomegaly.  : No suprapubic tenderness. No bladder distention, or CVAT     Recent Results (from the past 24 hours)   URINALYSIS, AUTO, W/O SCOPE    Collection Time: 10/24/24  5:48 PM   Result Value Ref Range    Glucose Urine Negative Negative mg/dL    Bilirubin Urine Negative Negative    Ketones, UA Negative Negative - Trace mg/dL    Spec Gravity 1.010 1.005 - 1.030    Blood Urine Large (A) Negative    PH Urine 7.0 5.0 - 8.0    Protein Urine 30 (A) Negative - Trace mg/dL    Urobilinogen Urine 0.2 0.2 - 1.0 mg/dL    Nitrite Urine Negative Negative    Leukocyte Esterase Urine Large (A) Negative    APPEARANCE cloudy Clear    Color Urine yellow Yellow    [employer] Lot# [id number] Numeric    [employer] Expiration Date 05-31-25 Date   Urine Pregnancy Test    Collection Time: 10/24/24  5:49 PM   Result Value Ref Range    Pregnancy Test, Urine neg     Control Line Present with a clear background (yes/no) yes Yes/No    Kit Lot # [id number] Numeric    Kit Expiration Date 05-10-25 Date         ASSESSMENT AND PLAN:       ICD-10-CM    1. UTI symptoms  R39.9 Urine Culture, Routine     URINALYSIS, AUTO, W/O SCOPE     Urine Pregnancy Test     nitrofurantoin monohydrate macro (MACROBID) 100 MG Oral Cap        Urine dip: + blood and leuks  Urine culture sent to lab.  Urine hcg: negative    Discussed HPI and physical exam with pt. Pt has a reassuring physical exam consistent with a lower uti. Treatment options discussed with patient and explained in detail. Will start nitrofurantoin pending urine culture  results. The risks, benefits and potential side effects of the medications were reviewed. Alternatives were discussed. Monitoring parameters and expected course outlined. We discussed signs and symptoms that should prompt her to go to the emergency department immediately for evaluation including any fevers, flank pain, abdominal pain, vomiting, vaginal bleeding or if she has any concerns. Patient to call PCP or go to emergency department if symptoms fail to respond as outlined, or worsen in any way. The patient agreed with the plan.       Patient Instructions   1. Rest. Drink plenty of fluids.  2. Nitrofurantoin as prescribed.  3. We will send urine culture to lab and call you with results in 3-4 days. At that time we will discuss continuing, stopping, or changing the antibiotic based on the urine culture results.  4. Follow up with PMD in 4-5 days for reeval. Follow up sooner or go to the emergency department immediately if symptoms worsen, change, you develop fevers, flank pain, vomiting, pelvic pain, vaginal discharge/bleeding or if you have any concerns.

## 2024-11-14 ENCOUNTER — TELEPHONE (OUTPATIENT)
Dept: TELEHEALTH | Age: 44
End: 2024-11-14

## 2024-12-11 ENCOUNTER — E-ADVICE (OUTPATIENT)
Dept: GASTROENTEROLOGY | Age: 44
End: 2024-12-11

## 2024-12-11 ENCOUNTER — ANESTHESIA EVENT (OUTPATIENT)
Dept: GASTROENTEROLOGY | Age: 44
End: 2024-12-11

## 2024-12-12 ENCOUNTER — ANESTHESIA (OUTPATIENT)
Dept: GASTROENTEROLOGY | Age: 44
End: 2024-12-12

## 2024-12-12 ENCOUNTER — APPOINTMENT (OUTPATIENT)
Dept: GASTROENTEROLOGY | Age: 44
End: 2024-12-12
Attending: INTERNAL MEDICINE

## 2024-12-12 VITALS
HEART RATE: 68 BPM | HEIGHT: 64 IN | DIASTOLIC BLOOD PRESSURE: 78 MMHG | OXYGEN SATURATION: 99 % | BODY MASS INDEX: 25.61 KG/M2 | RESPIRATION RATE: 18 BRPM | WEIGHT: 150 LBS | TEMPERATURE: 97.7 F | SYSTOLIC BLOOD PRESSURE: 116 MMHG

## 2024-12-12 DIAGNOSIS — Z12.11 SPECIAL SCREENING FOR MALIGNANT NEOPLASMS, COLON: ICD-10-CM

## 2024-12-12 DIAGNOSIS — K63.5 POLYP OF COLON, UNSPECIFIED PART OF COLON, UNSPECIFIED TYPE: ICD-10-CM

## 2024-12-12 DIAGNOSIS — K64.9 HEMORRHOIDS, UNSPECIFIED HEMORRHOID TYPE: ICD-10-CM

## 2024-12-12 LAB — COLONOSCOPY STUDY: ABNORMAL

## 2024-12-12 RX ORDER — NICOTINE POLACRILEX 4 MG
30 LOZENGE BUCCAL
Status: DISCONTINUED | OUTPATIENT
Start: 2024-12-12 | End: 2024-12-14 | Stop reason: HOSPADM

## 2024-12-12 RX ORDER — SODIUM CHLORIDE, SODIUM LACTATE, POTASSIUM CHLORIDE, CALCIUM CHLORIDE 600; 310; 30; 20 MG/100ML; MG/100ML; MG/100ML; MG/100ML
INJECTION, SOLUTION INTRAVENOUS CONTINUOUS
Status: DISCONTINUED | OUTPATIENT
Start: 2024-12-12 | End: 2024-12-14 | Stop reason: HOSPADM

## 2024-12-12 RX ORDER — PROPOFOL 10 MG/ML
INJECTION, EMULSION INTRAVENOUS PRN
Status: DISCONTINUED | OUTPATIENT
Start: 2024-12-12 | End: 2024-12-12

## 2024-12-12 RX ORDER — LIDOCAINE HYDROCHLORIDE 10 MG/ML
5 INJECTION, SOLUTION INFILTRATION; PERINEURAL PRN
Status: DISCONTINUED | OUTPATIENT
Start: 2024-12-12 | End: 2024-12-14 | Stop reason: HOSPADM

## 2024-12-12 RX ORDER — DEXTROSE MONOHYDRATE 50 MG/ML
INJECTION, SOLUTION INTRAVENOUS CONTINUOUS PRN
Status: DISCONTINUED | OUTPATIENT
Start: 2024-12-12 | End: 2024-12-14 | Stop reason: HOSPADM

## 2024-12-12 RX ORDER — 0.9 % SODIUM CHLORIDE 0.9 %
10 VIAL (ML) INJECTION PRN
Status: DISCONTINUED | OUTPATIENT
Start: 2024-12-12 | End: 2024-12-14 | Stop reason: HOSPADM

## 2024-12-12 RX ORDER — LIDOCAINE HYDROCHLORIDE 10 MG/ML
INJECTION, SOLUTION INFILTRATION; PERINEURAL PRN
Status: DISCONTINUED | OUTPATIENT
Start: 2024-12-12 | End: 2024-12-12

## 2024-12-12 RX ORDER — DEXTROSE MONOHYDRATE 25 G/50ML
25 INJECTION, SOLUTION INTRAVENOUS PRN
Status: DISCONTINUED | OUTPATIENT
Start: 2024-12-12 | End: 2024-12-14 | Stop reason: HOSPADM

## 2024-12-12 RX ORDER — 0.9 % SODIUM CHLORIDE 0.9 %
2 VIAL (ML) INJECTION EVERY 12 HOURS SCHEDULED
Status: DISCONTINUED | OUTPATIENT
Start: 2024-12-12 | End: 2024-12-14 | Stop reason: HOSPADM

## 2024-12-12 RX ORDER — SODIUM CHLORIDE 9 MG/ML
INJECTION, SOLUTION INTRAVENOUS CONTINUOUS
Status: DISCONTINUED | OUTPATIENT
Start: 2024-12-12 | End: 2024-12-14 | Stop reason: HOSPADM

## 2024-12-12 RX ADMIN — PROPOFOL 150 MG: 10 INJECTION, EMULSION INTRAVENOUS at 09:09

## 2024-12-12 RX ADMIN — PROPOFOL 190 MG: 10 INJECTION, EMULSION INTRAVENOUS at 09:26

## 2024-12-12 RX ADMIN — SODIUM CHLORIDE: 9 INJECTION, SOLUTION INTRAVENOUS at 08:03

## 2024-12-12 RX ADMIN — LIDOCAINE HYDROCHLORIDE 50 MG: 10 INJECTION, SOLUTION INFILTRATION; PERINEURAL at 09:09

## 2024-12-12 ASSESSMENT — ACTIVITIES OF DAILY LIVING (ADL)
FEEDING: INDEPENDENT
BATHING: INDEPENDENT
ADL_BEFORE_ADMISSION: INDEPENDENT
ADL_SCORE: 24
DRESSING: INDEPENDENT
TOILETING: INDEPENDENT

## 2024-12-12 ASSESSMENT — PAIN SCALES - GENERAL
PAINLEVEL_OUTOF10: 0
PAINLEVEL_OUTOF10: 0

## 2024-12-12 ASSESSMENT — ENCOUNTER SYMPTOMS: EXERCISE TOLERANCE: GOOD (>4 METS)

## 2024-12-16 ENCOUNTER — CLINICAL DOCUMENTATION (OUTPATIENT)
Dept: GASTROENTEROLOGY | Age: 44
End: 2024-12-16

## 2024-12-16 LAB
ASR DISCLAIMER: NORMAL
CASE RPRT: NORMAL
CLINICAL INFO: NORMAL
PATH REPORT.FINAL DX SPEC: NORMAL
PATH REPORT.GROSS SPEC: NORMAL

## 2025-01-25 ENCOUNTER — OFFICE VISIT (OUTPATIENT)
Dept: FAMILY MEDICINE CLINIC | Facility: CLINIC | Age: 45
End: 2025-01-25
Payer: COMMERCIAL

## 2025-01-25 VITALS
RESPIRATION RATE: 18 BRPM | OXYGEN SATURATION: 98 % | TEMPERATURE: 98 F | SYSTOLIC BLOOD PRESSURE: 125 MMHG | HEIGHT: 64 IN | DIASTOLIC BLOOD PRESSURE: 83 MMHG | WEIGHT: 160 LBS | BODY MASS INDEX: 27.31 KG/M2 | HEART RATE: 93 BPM

## 2025-01-25 DIAGNOSIS — R39.9 UTI SYMPTOMS: ICD-10-CM

## 2025-01-25 DIAGNOSIS — N30.01 ACUTE CYSTITIS WITH HEMATURIA: Primary | ICD-10-CM

## 2025-01-25 LAB
BILIRUBIN: NEGATIVE
GLUCOSE (URINE DIPSTICK): NEGATIVE MG/DL
KETONES (URINE DIPSTICK): NEGATIVE MG/DL
MULTISTIX LOT#: ABNORMAL NUMERIC
NITRITE, URINE: NEGATIVE
PH, URINE: 6 (ref 4.5–8)
PROTEIN (URINE DIPSTICK): NEGATIVE MG/DL
SPECIFIC GRAVITY: 1.02 (ref 1–1.03)
URINE-COLOR: YELLOW
UROBILINOGEN,SEMI-QN: 0.2 MG/DL (ref 0–1.9)

## 2025-01-25 PROCEDURE — 87086 URINE CULTURE/COLONY COUNT: CPT | Performed by: PHYSICIAN ASSISTANT

## 2025-01-25 RX ORDER — NITROFURANTOIN 25; 75 MG/1; MG/1
100 CAPSULE ORAL 2 TIMES DAILY
Qty: 10 CAPSULE | Refills: 0 | Status: SHIPPED | OUTPATIENT
Start: 2025-01-25 | End: 2025-01-30

## 2025-01-25 RX ORDER — PHENAZOPYRIDINE HYDROCHLORIDE 200 MG/1
200 TABLET, FILM COATED ORAL 3 TIMES DAILY PRN
Qty: 6 TABLET | Refills: 0 | Status: SHIPPED | OUTPATIENT
Start: 2025-01-25 | End: 2025-01-27

## 2025-01-25 RX ORDER — ESTRADIOL 0.05 MG/D
1 PATCH, EXTENDED RELEASE TRANSDERMAL
COMMUNITY

## 2025-01-25 NOTE — PATIENT INSTRUCTIONS
Urine culture pending, results expected in 24-72 hours.    Macrobid (nitrofurantoin) 100 mg twice daily for 5 days.    Pyridium 200 mg up to three times daily as needed for urinary symptoms (will turn your urine orange and may stain clothing).    Encourage hydration, cut back on caffeine, vaginal hygiene reviewed.     Follow up with your primary care provider if your symptoms fail to improve and resolve as anticipated    Go to the Immediate Care or Emergency Department in event of new or worsening symptoms at any time

## 2025-01-25 NOTE — PROGRESS NOTES
CHIEF COMPLAINT:     Chief Complaint   Patient presents with    Urinary Symptoms     Suspected uti - Entered by patient  Started couple days ago   Urgency burning          HPI:   Cait Santiago is a 44 year old female who presents with symptoms of UTI. Patient reporting symptoms of dysuria, urgency, frequency x 5 days.  Initially suspected local irritation, however symptoms persisted and worsened.  Prior h/o UTI with similar symptoms.   Denies fever, no chills/sweats, no gross hematuria, no vaginal discharge/bleeding, no flank/abd pain.   Denies h/o kidney stones.   S/p hysterectomy on transdermal estrogen, no other meds.   Last UTI in 10/2024, grew out pan-sensitive ECOLI and nonhemolytic strep.     No other concerns.     No OTC meds for sx management.     Current Outpatient Medications   Medication Sig Dispense Refill    nitrofurantoin monohydrate macro 100 MG Oral Cap Take 1 capsule (100 mg total) by mouth 2 (two) times daily for 5 days. 10 capsule 0    phenazopyridine (PYRIDIUM) 200 MG Oral Tab Take 1 tablet (200 mg total) by mouth 3 (three) times daily as needed for Pain. 6 tablet 0    Ibuprofen Does not apply Powder Take by mouth daily as needed.      Multiple Vitamin (MULTI-VITAMIN) Oral Tab Take 1 tablet by mouth daily.      estradiol 0.05 MG/24HR Transdermal Patch Biweekly Place 1 patch onto the skin twice a week.        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     Employer: [employer]   Tobacco Use    Smoking status: Never    Smokeless tobacco: Never   Vaping Use    Vaping status: Never Used   Substance and Sexual Activity    Alcohol use: Yes     Comment: social    Drug use: Never    Sexual activity: Yes     Partners: Male     Birth control/protection: OCP     Social Drivers of Health     Financial Resource Strain: Low Risk  (8/6/2024)     Received from [facility]    Financial Resource Strain     In the past year, have you or any family members you live with been unable to get any of the following when it was really needed? Check all that apply.: None   Food Insecurity: Patient Declined (8/6/2024)    Received from [facility]    Food Insecurity     Within the past 12 months, you worried that your food would run out before you got money to buy more.  : Patient declined     Within the past 12 months, the food you bought just didn't last and you didn't have money to get more. : Patient declined   Transportation Needs: Not At Risk (8/6/2024)    Received from [facility]    Transportation Needs     In the past 12 months, has lack of reliable transportation kept you from medical appointments, meetings, work or from getting things needed for daily living? : No   Physical Activity: Low Risk  (8/6/2024)    Received from [facility]    Exercise Vital Sign     On average, how many days per week do you engage in moderate to strenuous exercise (like a brisk walk)?: 4 days     On average, how many minutes do you engage in exercise at this level?: 50 min   Stress: Low Risk  (8/6/2024)    Received from [facility]    Stress     Stress is when someone feels tense, nervous, anxious, or can't sleep at night because their mind is troubled. How stressed are you? : A little bit   Social Connections: Low Risk  (8/6/2024)    Received from [facility]    Social Connections     How often do you see or talk to people that you care about and feel close to? (For example: talking to friends on the phone, visiting friends or family, going to [employer] or club meetings): 5 or more times a week    Received from [facility]    Housing Stability         REVIEW OF SYSTEMS:   GENERAL: See above  GI: See HPI.    : See HPI.      EXAM:   /83   Pulse 93   Temp 97.5 °F (36.4 °C)   Resp 18   Ht 5' 4\"  (1.626 m)   Wt 160 lb (72.6 kg)   LMP  (LMP Unknown)   SpO2 98%   BMI 27.46 kg/m²   GENERAL: well developed, well nourished,in no apparent distress  CARDIO: RRR, no murmurs  LUNGS: clear to ausculation bilaterally, no wheezing or rhonchi  ABD Soft, ntnd, no masses, no g/r/r, no organomegaly, no CVAT   MS  HERRMANN, no c/c/e.     Recent Results (from the past 24 hours)   URINALYSIS, AUTO, W/O SCOPE    Collection Time: 01/25/25 11:44 AM   Result Value Ref Range    Glucose Urine Negative Negative mg/dL    Bilirubin Urine Negative Negative    Ketones, UA Negative Negative - Trace mg/dL    Spec Gravity 1.020 1.005 - 1.030    Blood Urine Large (A) Negative    PH Urine 6.0 5.0 - 8.0    Protein Urine Negative Negative - Trace mg/dL    Urobilinogen Urine 0.2 0.2 - 1.0 mg/dL    Nitrite Urine Negative Negative    Leukocyte Esterase Urine Moderate (A) Negative    APPEARANCE cloudy Clear    Color Urine yellow Yellow    Multistix Lot# [id number] Numeric    Multistix Expiration Date 05/31/2025 Date         ASSESSMENT AND PLAN:   Cait Santiago is a 44 year old female presents with urinary symptoms.    ASSESSMENT:  Encounter Diagnoses   Name Primary?    UTI symptoms     Acute cystitis with hematuria Yes       PLAN: Meds as listed below.  Comfort measures as described in Patient Instructions. Urine cx pending.     Meds & Refills for this Visit:  Requested Prescriptions     Signed Prescriptions Disp Refills    nitrofurantoin monohydrate macro 100 MG Oral Cap 10 capsule 0     Sig: Take 1 capsule (100 mg total) by mouth 2 (two) times daily for 5 days.    phenazopyridine (PYRIDIUM) 200 MG Oral Tab 6 tablet 0     Sig: Take 1 tablet (200 mg total) by mouth 3 (three) times daily as needed for Pain.       Risk and benefits of medication discussed.   Stressed importance of completing full course of antibiotic unless told otherwise.     The patient indicates understanding of these issues and agrees to the plan.  The patient is asked to see PCP  in 3 days if not better. Seek care immediately for new onset of fever, vomiting, worsening symptoms.    Patient Instructions    Urine culture pending, results expected in 24-72 hours.    Macrobid (nitrofurantoin) 100 mg twice daily for 5 days.    Pyridium 200 mg up to three times daily as needed for urinary symptoms (will turn your urine orange and may stain clothing).    Encourage hydration, cut back on caffeine, vaginal hygiene reviewed.     Follow up with your primary care provider if your symptoms fail to improve and resolve as anticipated    Go to the Immediate Care or Emergency Department in event of new or worsening symptoms at any time       Teresa Bañuelos PA-C

## 2025-06-03 DIAGNOSIS — Z90.710 HISTORY OF TOTAL HYSTERECTOMY: ICD-10-CM

## 2025-06-05 RX ORDER — ESTRADIOL 0.05 MG/D
1 PATCH, EXTENDED RELEASE TRANSDERMAL
Qty: 16 PATCH | Refills: 0 | Status: SHIPPED | OUTPATIENT
Start: 2025-06-05

## 2025-07-02 DIAGNOSIS — Z90.710 HISTORY OF TOTAL HYSTERECTOMY: ICD-10-CM

## 2025-07-03 RX ORDER — ESTRADIOL 0.05 MG/D
1 PATCH, EXTENDED RELEASE TRANSDERMAL
Qty: 24 PATCH | Refills: 0 | Status: SHIPPED | OUTPATIENT
Start: 2025-07-03